# Patient Record
Sex: FEMALE | Race: WHITE | NOT HISPANIC OR LATINO | Employment: FULL TIME | URBAN - METROPOLITAN AREA
[De-identification: names, ages, dates, MRNs, and addresses within clinical notes are randomized per-mention and may not be internally consistent; named-entity substitution may affect disease eponyms.]

---

## 2017-03-06 ENCOUNTER — HISTORICAL (OUTPATIENT)
Dept: ADMINISTRATIVE | Facility: HOSPITAL | Age: 47
End: 2017-03-06

## 2017-09-14 ENCOUNTER — HISTORICAL (OUTPATIENT)
Dept: ADMINISTRATIVE | Facility: HOSPITAL | Age: 47
End: 2017-09-14

## 2017-10-30 ENCOUNTER — HISTORICAL (OUTPATIENT)
Dept: PREADMISSION TESTING | Facility: HOSPITAL | Age: 47
End: 2017-10-30

## 2017-11-02 ENCOUNTER — HISTORICAL (OUTPATIENT)
Dept: SURGERY | Facility: HOSPITAL | Age: 47
End: 2017-11-02

## 2022-02-23 ENCOUNTER — TELEPHONE (OUTPATIENT)
Dept: HEMATOLOGY/ONCOLOGY | Facility: CLINIC | Age: 52
End: 2022-02-23
Payer: COMMERCIAL

## 2022-02-23 NOTE — TELEPHONE ENCOUNTER
Received message that pt wanted an oncology consult appt for squamous cell carcinoma . I have spoken with the pt over the phone and together we set up appt with Dr. Olson for 3/4/22 at 9 am at the [facility]. Travel directions and address reviewed. Pt in agreement with plan  Records in care everywhere. Phone number corrected in demographics and pt has [employer] insurance.

## 2022-04-30 NOTE — OP NOTE
DATE OF SURGERY:    11/02/2017    SURGEON:  Elias James MD    PREOPERATIVE DIAGNOSIS:  Soft tissue mass of the left posterior chest wall (rib cage).    POSTOPERATIVE DIAGNOSIS:  Subfascial lipoma (3 cm x 3 cm) left posterior chest wall.    PROCEDURE:  Excision of soft tissue mass, subfascial lipoma, left posterior chest wall.    PROCEDURE IN DETAIL:  The patient was placed on the OR table in the right lateral decubitus position with pillow padding between the knees, and cushioning her arms.  The patient was placed under MAC anesthesia and left chest wall prepped and draped in the usual sterile fashion.  The lesion was in the posterior axillary line in between the 10th and 11th ribs measuring about 3 to 4 cm, obliquely oriented in the direction of the ribs, incision was performed through skin and subcutaneous tissues.  Fascia was incised and a well developed lipoma was excised in its entirety from directly atop the muscle tissue and submitted for permanent histologic examination.  Hemostasis was secured with the electrocautery and tissues injected with 0.25% Marcaine with epinephrine using all 10 cc.  The wound was irrigated and closed, the fascia done with a running 3-0 Vicryl suture, interrupted subcutaneous tissues placed with closure of the subcutaneous tissues with 3-0 Vicryl sutures, and then the skin closed with a running subcuticular of 4-0 Vicryl completing the procedure.  Steri-Strips and dressings were applied and the patient was brought back to her room in satisfactory condition.        ______________________________  MD MISSY Carter/IRAJ  DD:  11/02/2017  Time:  01:20PM  DT:  11/03/2017  Time:  12:00PM  Job #:  [id number]